# Patient Record
Sex: MALE | Race: WHITE | NOT HISPANIC OR LATINO | Employment: UNEMPLOYED | URBAN - METROPOLITAN AREA
[De-identification: names, ages, dates, MRNs, and addresses within clinical notes are randomized per-mention and may not be internally consistent; named-entity substitution may affect disease eponyms.]

---
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
## 2024-05-16 NOTE — ED PROVIDER NOTES
Subjective   States injured left wrist while boxing a few weeks ago. States he landed a punch awkwardly and has been having pain in the ulnar wrist since then. Has applied ice and wrapped intermittently with no improvement in pain.       History provided by:  Patient   used: No        Review of Systems   Constitutional: Negative.    HENT: Negative.    Eyes: Negative.    Respiratory: Negative.    Cardiovascular: Negative.    Gastrointestinal: Negative.    Endocrine: Negative.    Genitourinary: Negative.    Musculoskeletal: Negative.    Skin: Negative.    Allergic/Immunologic: Negative.    Neurological: Negative.    Hematological: Negative.    Psychiatric/Behavioral: Negative.        History reviewed. No pertinent past medical history.    No Known Allergies    Past Surgical History:   Procedure Laterality Date   • KNEE ACL RECONSTRUCTION     • KNEE ACL RECONSTRUCTION         History reviewed. No pertinent family history.    Social History     Socioeconomic History   • Marital status: Single   Tobacco Use   • Smoking status: Never Smoker   Substance and Sexual Activity   • Alcohol use: Never   • Drug use: Never           Objective   Physical Exam  Constitutional:       Appearance: Normal appearance.   HENT:      Head: Normocephalic and atraumatic.      Nose: Nose normal.      Mouth/Throat:      Mouth: Mucous membranes are moist.   Eyes:      Pupils: Pupils are equal, round, and reactive to light.   Cardiovascular:      Rate and Rhythm: Normal rate and regular rhythm.      Pulses: Normal pulses.   Pulmonary:      Effort: Pulmonary effort is normal.      Breath sounds: Normal breath sounds.   Abdominal:      General: Abdomen is flat. Bowel sounds are normal.      Palpations: Abdomen is soft.   Musculoskeletal:      Right wrist: Normal.      Left wrist: Tenderness present. No snuff box tenderness. Decreased range of motion. Normal pulse.        Arms:       Cervical back: Normal range of motion.  To schedule the liver MRI, call [phone].     Schedule heart echocardiogram.  Contact Cardiac Services  at [phone], to schedule this appointment.     Referrals to urology has been signed. Schedulers will call you in the next 3-5 business days.     Schedule thyroidi ultrasound for August 2024.  To schedule the ultrasound, call [phone].     Refill for levothyroxine has been sent to pharmacy.    Schedule repeat blood test for June 2024.     Skin:     General: Skin is warm and dry.      Capillary Refill: Capillary refill takes less than 2 seconds.   Neurological:      General: No focal deficit present.      Mental Status: He is alert and oriented to person, place, and time. Mental status is at baseline.   Psychiatric:         Mood and Affect: Mood normal.         Procedures           ED Course                                           MDM  Number of Diagnoses or Management Options  Left wrist pain: new and requires workup  Sprain of left wrist, initial encounter: new and requires workup     Amount and/or Complexity of Data Reviewed  Tests in the radiology section of CPT®: reviewed and ordered    Risk of Complications, Morbidity, and/or Mortality  Presenting problems: low  Diagnostic procedures: low  Management options: low    Patient Progress  Patient progress: stable      Final diagnoses:   Sprain of left wrist, initial encounter   Left wrist pain       ED Disposition  ED Disposition     ED Disposition Condition Comment    Discharge Stable           Tayla Pendleton MD  [address] [place] [address] [id number] [phone]    Schedule an appointment as soon as possible for a visit   if no better in 5 to 7 days         Medication List      New Prescriptions    ibuprofen 800 MG tablet  Commonly known as: ADVIL,MOTRIN  Take 1 tablet by mouth Every 6 (Six) Hours As Needed for Mild Pain .           Where to Get Your Medications      These medications were sent to [id number] - [place], [place] - [address] AT [facility] - [phone] [facility] [phone] FX  [address] [place] [address]    Hours: 24-hours Phone: [phone]   · ibuprofen 800 MG tablet          Felicita Couch, FRANCINE  10/14/21 0261